# Patient Record
Sex: MALE | Race: WHITE | NOT HISPANIC OR LATINO | Employment: OTHER | URBAN - METROPOLITAN AREA
[De-identification: names, ages, dates, MRNs, and addresses within clinical notes are randomized per-mention and may not be internally consistent; named-entity substitution may affect disease eponyms.]

---

## 2017-03-01 DIAGNOSIS — I48.91 ATRIAL FIBRILLATION, UNSPECIFIED TYPE (HCC): ICD-10-CM

## 2017-03-01 DIAGNOSIS — I48.20 CHRONIC ATRIAL FIBRILLATION (HCC): ICD-10-CM

## 2017-03-01 RX ORDER — WARFARIN SODIUM 3 MG/1
TABLET ORAL
Qty: 135 TAB | Refills: 1 | Status: SHIPPED | OUTPATIENT
Start: 2017-03-01 | End: 2017-05-30 | Stop reason: SDUPTHER

## 2017-03-02 RX ORDER — WARFARIN SODIUM 3 MG/1
TABLET ORAL
Qty: 135 TAB | Refills: 1 | OUTPATIENT
Start: 2017-03-02

## 2017-03-06 ENCOUNTER — ANTICOAGULATION VISIT (OUTPATIENT)
Dept: VASCULAR LAB | Facility: MEDICAL CENTER | Age: 82
End: 2017-03-06
Attending: INTERNAL MEDICINE
Payer: MEDICARE

## 2017-03-06 DIAGNOSIS — I48.91 ATRIAL FIBRILLATION, UNSPECIFIED TYPE (HCC): ICD-10-CM

## 2017-03-06 LAB — INR PPP: 2.6 (ref 2–3.5)

## 2017-03-06 PROCEDURE — 99211 OFF/OP EST MAY X REQ PHY/QHP: CPT

## 2017-03-06 PROCEDURE — 85610 PROTHROMBIN TIME: CPT

## 2017-03-06 NOTE — PROGRESS NOTES
Anticoagulation Summary as of 3/6/2017     INR goal 2.0-3.0   Selected INR 2.6 (3/6/2017)   Maintenance plan 4.5 mg (3 mg x 1.5) on Mon, Wed, Fri; 3 mg (3 mg x 1) all other days   Weekly total 25.5 mg   No change documented Caroline Nolan   Plan last modified SANDIP Alejandro (8/8/2016)   Next INR check 5/29/2017   Target end date Indefinite    Indications   AF (atrial fibrillation) (CMS-[facility]) [I48.91]  Atrial fibrillation (CMS-[facility]) [I48.91]         Anticoagulation Episode Summary     [facility] location [facility]    Preferred lab     Send INR reminders to     Comments       Anticoagulation Care Providers     Provider Role Specialty Phone number    Micki Manuel D.O. Referring Internal Medicine [phone]    [facility] Responsible  [phone]        Anticoagulation Patient Findings   Negatives Missed Doses, Extra Doses, Medication Changes, Antibiotic Use, Diet Changes, Dental/Other Procedures, Hospitalization, Bleeding Gums, Nose Bleeds, Blood in Urine, Blood in Stool, Any Bruising, Other Complaints        Patient seen in clinic today, and INR was therapeutic at 2.6.  Confirmed the current warfarin dosing regimen and patient compliance. Patient denies any interval changes to diet and/or medications. Patient denies any signs/symptoms of bleeding or clotting. Patient will continue with the current warfarin dosing regimen, and will follow up again in 12 weeks.    Next appt Monday  May 29, 2017 9:30am    Franki WilkinsD

## 2017-03-06 NOTE — MR AVS SNAPSHOT
Seven Kowalski   3/6/2017 9:30 AM   Anticoagulation Visit   MRN: [id number]    Department:  Vascular Medicine   Dept Phone:  [phone]    Description:  Male : 1934   Provider:  [facility] EXAM 4           Allergies as of 3/6/2017     No Known Allergies      You were diagnosed with     Atrial fibrillation, unspecified type (CMS-HCC)   [0779469]         Vital Signs     Smoking Status                   Passive Smoke Exposure - Never Smoker           Basic Information     Date Of Birth Sex Race Ethnicity Preferred Language    1934 Male White Non- English      Your appointments     Mar 06, 2017  9:30 AM   Established Patient with [facility] EXAM 4   [facility]  (--)    [address]  [place] [address] [phone]            May 29, 2017  9:30 AM   Established Patient with [facility] EXAM 5   [facility]  (--)    [address]  [place] [address]   [phone]              Problem List              ICD-10-CM Priority Class Noted - Resolved    DM (diabetes mellitus) (CMS-HCC) E11.9   2010 - Present    Hypothyroid E03.9   2010 - Present    Hyperlipidemia E78.5   2010 - Present    Colon polyp K63.5   2010 - Present    Pneumonia J18.9   2015 - Present    Transaminitis R74.0   2015 - Present    AF (atrial fibrillation) (CMS-HCC) I48.91   2015 - Present    Macrocytic anemia D53.9   2015 - Present    Leukocytosis D72.829   2015 - Present    CKD (chronic kidney disease) N18.9   2015 - Present    Priapism N48.30   2015 - Present    Urine retention R33.9   2015 - Present    Acute worsening of stage 3 chronic kidney disease N18.3   2015 - Present    Hyperkalemia E87.5   2015 - Present    Hyponatremia E87.1   2015 - Present    Atrial fibrillation (CMS-HCC) I48.91   2015 - Present      Health Maintenance        Date Due Completion  Dates    DIABETES MONOFILAMENT / LE EXAM 1935 ---    RETINAL SCREENING 8/21/1952 ---    IMM DTaP/Tdap/Td Vaccine (1 - Tdap) 8/21/1953 ---    IMM ZOSTER VACCINE 8/21/1994 ---    IMM PNEUMOCOCCAL 65+ (ADULT) LOW/MEDIUM RISK SERIES (2 of 2 - PPSV23) 3/3/2015 3/3/2014    IMM INFLUENZA (1) 9/1/2016 10/10/2014    URINE ACR / MICROALBUMIN 3/8/2017 3/8/2016    A1C SCREENING 6/21/2017 12/21/2016, 10/13/2016, 6/29/2016, 3/8/2016, 3/8/2016, 11/3/2015, 11/3/2015, 7/27/2015, 2/17/2015, 11/8/2010    FASTING LIPID PROFILE 6/29/2017 6/29/2016, 3/8/2016, 3/8/2016, 11/3/2015, 2/17/2015, 2/17/2015    SERUM CREATININE 12/21/2017 12/21/2016, 8/25/2016, 6/29/2016, 3/8/2016, 3/8/2016, 11/3/2015, 8/31/2015, 8/2/2015, 8/1/2015, 8/1/2015, 7/31/2015, 7/30/2015, 7/29/2015, 7/28/2015, 7/27/2015, 5/14/2015, 2/17/2015    COLONOSCOPY 11/19/2020 11/19/2010 (Done)    Override on 11/19/2010: Done ([facility])            Results     POCT Protime      Component    INR    2.6                        Current Immunizations     13-VALENT PCV PREVNAR 3/3/2014    Influenza TIV (IM) 10/10/2014      Below and/or attached are the medications your provider expects you to take. Review all of your home medications and newly ordered medications with your provider and/or pharmacist. Follow medication instructions as directed by your provider and/or pharmacist. Please keep your medication list with you and share with your provider. Update the information when medications are discontinued, doses are changed, or new medications (including over-the-counter products) are added; and carry medication information at all times in the event of emergency situations     Allergies:  No Known Allergies          Medications  Valid as of: March 06, 2017 -  9:16 AM    Generic Name Brand Name Tablet Size Instructions for use    Allopurinol (Tab) ZYLOPRIM 100 MG Take 100 mg by mouth 2 times a day.        Aspirin (Tablet Delayed Response) ECOTRIN 81 MG Take 81 mg by mouth every day with  lunch.        Atorvastatin Calcium (Tab) LIPITOR 20 MG Take 20 mg by mouth every evening.        Carvedilol (Tab) COREG 3.125 MG Take 1 Tab by mouth 2 times a day, with meals.        Ergocalciferol (Cap) DRISDOL 29052 UNITS Take 50,000 Units by mouth every Wednesday.        Insulin Detemir (Solution) LEVEMIR 100 UNIT/ML Inject 10 Units as instructed every evening.        Insulin Lispro (Solution Pen-injector) HUMALOG 100 UNIT/ML Inject 2-8 Units as instructed 3 times a day before meals.        Iron Combinations (Tab) Iron  Take 1 Tab by mouth every day.        Levothyroxine Sodium (Tab) SYNTHROID 100 MCG Take 100 mcg by mouth Every morning on an empty stomach.        Multiple Vitamin (Tab) THERAGRAN  Take 1 Tab by mouth every day.        Pioglitazone HCl (Tab) ACTOS 45 MG Take 45 mg by mouth every day.        Warfarin Sodium (Tab) COUMADIN 3 MG TAKE 1- 1 1/2 TABLETS BY MOUTH EVERYDAY AS DIRECTED BY COUMADIN CLINIC        .                 Medicines prescribed today were sent to:     [facility][id number] [place] [address]    Phone: [phone] Fax: [phone]    Open 24 Hours?: No      Medication refill instructions:       If your prescription bottle indicates you have medication refills left, it is not necessary to call your provider’s office. Please contact your pharmacy and they will refill your medication.    If your prescription bottle indicates you do not have any refills left, you may request refills at any time through one of the following ways: The online [employer] system (except Urgent Care), by calling your provider’s office, or by asking your pharmacy to contact your provider’s office with a refill request. Medication refills are processed only during regular business hours and may not be available until the next business day. Your provider may request additional information or to have a follow-up visit with you prior to refilling your medication.   *Please  Note: Medication refills are assigned a new Rx number when refilled electronically. Your pharmacy may indicate that no refills were authorized even though a new prescription for the same medication is available at the pharmacy. Please request the medicine by name with the pharmacy before contacting your provider for a refill.        Other Notes About Your Plan     Colonoscopy/polyp 2005. Due for repeat 2010        Warfarin Dosing Calendar   March 2017 Details    Sun Mon Tue Wed Thu Fri Sat        1               2               3               4                 5               6   2.6   4.5 mg   See details      7      3 mg         8      4.5 mg         9      3 mg         10      4.5 mg         11      3 mg           12      3 mg         13      4.5 mg         14      3 mg         15      4.5 mg         16      3 mg         17      4.5 mg         18      3 mg           19      3 mg         20      4.5 mg         21      3 mg         22      4.5 mg         23      3 mg         24      4.5 mg         25      3 mg           26      3 mg         27      4.5 mg         28      3 mg         29      4.5 mg         30      3 mg         31      4.5 mg           Date Details   03/06 This INR check   INR: 2.6               How to take your warfarin dose     To take:  3 mg Take 1 of the 3 mg tablets.    To take:  4.5 mg Take 1.5 of the 3 mg tablets.           Warfarin Dosing Calendar   April 2017 Details    Sun Mon Tue Wed Thu Fri Sat           1      3 mg           2      3 mg         3      4.5 mg         4      3 mg         5      4.5 mg         6      3 mg         7      4.5 mg         8      3 mg           9      3 mg         10      4.5 mg         11      3 mg         12      4.5 mg         13      3 mg         14      4.5 mg         15      3 mg           16      3 mg         17      4.5 mg         18      3 mg         19      4.5 mg         20      3 mg         21      4.5 mg         22      3 mg           23      3 mg          24      4.5 mg         25      3 mg         26      4.5 mg         27      3 mg         28      4.5 mg         29      3 mg           30      3 mg                Date Details   No additional details            How to take your warfarin dose     To take:  3 mg Take 1 of the 3 mg tablets.    To take:  4.5 mg Take 1.5 of the 3 mg tablets.           Warfarin Dosing Calendar   May 2017 Details    Sun Mon Tue Wed Thu Fri Sat      1      4.5 mg         2      3 mg         3      4.5 mg         4      3 mg         5      4.5 mg         6      3 mg           7      3 mg         8      4.5 mg         9      3 mg         10      4.5 mg         11      3 mg         12      4.5 mg         13      3 mg           14      3 mg         15      4.5 mg         16      3 mg         17      4.5 mg         18      3 mg         19      4.5 mg         20      3 mg           21      3 mg         22      4.5 mg         23      3 mg         24      4.5 mg         25      3 mg         26      4.5 mg         27      3 mg           28      3 mg         29      4.5 mg         30               31                   Date Details   No additional details    Date of next INR:  5/29/2017         How to take your warfarin dose     To take:  3 mg Take 1 of the 3 mg tablets.    To take:  4.5 mg Take 1.5 of the 3 mg tablets.              MyChart Status: Patient Declined

## 2017-03-07 LAB — INR BLD: 2.6 (ref 0.9–1.2)

## 2017-04-11 ENCOUNTER — HOSPITAL ENCOUNTER (OUTPATIENT)
Dept: LAB | Facility: MEDICAL CENTER | Age: 82
End: 2017-04-11
Attending: FAMILY MEDICINE
Payer: MEDICARE

## 2017-04-11 LAB
ANION GAP SERPL CALC-SCNC: 6 MMOL/L (ref 0–11.9)
BUN SERPL-MCNC: 30 MG/DL (ref 8–22)
CALCIUM SERPL-MCNC: 9.6 MG/DL (ref 8.5–10.5)
CHLORIDE SERPL-SCNC: 106 MMOL/L (ref 96–112)
CO2 SERPL-SCNC: 27 MMOL/L (ref 20–33)
CREAT SERPL-MCNC: 1.68 MG/DL (ref 0.5–1.4)
EST. AVERAGE GLUCOSE BLD GHB EST-MCNC: 226 MG/DL
GFR SERPL CREATININE-BSD FRML MDRD: 39 ML/MIN/1.73 M 2
GLUCOSE SERPL-MCNC: 127 MG/DL (ref 65–99)
HBA1C MFR BLD: 9.5 % (ref 0–5.6)
POTASSIUM SERPL-SCNC: 4.3 MMOL/L (ref 3.6–5.5)
SODIUM SERPL-SCNC: 139 MMOL/L (ref 135–145)

## 2017-04-11 PROCEDURE — 36415 COLL VENOUS BLD VENIPUNCTURE: CPT

## 2017-04-11 PROCEDURE — 83036 HEMOGLOBIN GLYCOSYLATED A1C: CPT

## 2017-04-11 PROCEDURE — 80048 BASIC METABOLIC PNL TOTAL CA: CPT

## 2017-05-10 ENCOUNTER — HOSPITAL ENCOUNTER (OUTPATIENT)
Dept: LAB | Facility: MEDICAL CENTER | Age: 82
End: 2017-05-10
Attending: NURSE PRACTITIONER
Payer: MEDICARE

## 2017-05-10 LAB
25(OH)D3 SERPL-MCNC: 39 NG/ML (ref 30–100)
ALBUMIN SERPL BCP-MCNC: 4 G/DL (ref 3.2–4.9)
APPEARANCE UR: CLEAR
BACTERIA #/AREA URNS HPF: ABNORMAL /HPF
BASOPHILS # BLD AUTO: 1.2 % (ref 0–1.8)
BASOPHILS # BLD: 0.11 K/UL (ref 0–0.12)
BILIRUB UR QL STRIP.AUTO: NEGATIVE
BUN SERPL-MCNC: 40 MG/DL (ref 8–22)
CALCIUM SERPL-MCNC: 9.4 MG/DL (ref 8.5–10.5)
CHLORIDE SERPL-SCNC: 106 MMOL/L (ref 96–112)
CO2 SERPL-SCNC: 24 MMOL/L (ref 20–33)
COLOR UR: ABNORMAL
CREAT SERPL-MCNC: 1.64 MG/DL (ref 0.5–1.4)
CREAT UR-MCNC: 69.7 MG/DL
EOSINOPHIL # BLD AUTO: 0.21 K/UL (ref 0–0.51)
EOSINOPHIL NFR BLD: 2.2 % (ref 0–6.9)
EPI CELLS #/AREA URNS HPF: ABNORMAL /HPF
ERYTHROCYTE [DISTWIDTH] IN BLOOD BY AUTOMATED COUNT: 55.9 FL (ref 35.9–50)
EST. AVERAGE GLUCOSE BLD GHB EST-MCNC: 226 MG/DL
GFR SERPL CREATININE-BSD FRML MDRD: 40 ML/MIN/1.73 M 2
GLUCOSE SERPL-MCNC: 131 MG/DL (ref 65–99)
GLUCOSE UR STRIP.AUTO-MCNC: NEGATIVE MG/DL
HBA1C MFR BLD: 9.5 % (ref 0–5.6)
HCT VFR BLD AUTO: 43.7 % (ref 42–52)
HGB BLD-MCNC: 14.3 G/DL (ref 14–18)
IMM GRANULOCYTES # BLD AUTO: 0.07 K/UL (ref 0–0.11)
IMM GRANULOCYTES NFR BLD AUTO: 0.7 % (ref 0–0.9)
KETONES UR STRIP.AUTO-MCNC: NEGATIVE MG/DL
LEUKOCYTE ESTERASE UR QL STRIP.AUTO: NEGATIVE
LYMPHOCYTES # BLD AUTO: 1.89 K/UL (ref 1–4.8)
LYMPHOCYTES NFR BLD: 19.9 % (ref 22–41)
MAGNESIUM SERPL-MCNC: 1.5 MG/DL (ref 1.5–2.5)
MCH RBC QN AUTO: 34.1 PG (ref 27–33)
MCHC RBC AUTO-ENTMCNC: 32.7 G/DL (ref 33.7–35.3)
MCV RBC AUTO: 104.3 FL (ref 81.4–97.8)
MICRO URNS: ABNORMAL
MONOCYTES # BLD AUTO: 1.1 K/UL (ref 0–0.85)
MONOCYTES NFR BLD AUTO: 11.6 % (ref 0–13.4)
MUCOUS THREADS #/AREA URNS HPF: ABNORMAL /HPF
NEUTROPHILS # BLD AUTO: 6.12 K/UL (ref 1.82–7.42)
NEUTROPHILS NFR BLD: 64.4 % (ref 44–72)
NITRITE UR QL STRIP.AUTO: NEGATIVE
NRBC # BLD AUTO: 0 K/UL
NRBC BLD AUTO-RTO: 0 /100 WBC
PH UR STRIP.AUTO: 5.5 [PH]
PHOSPHATE SERPL-MCNC: 3.2 MG/DL (ref 2.5–4.5)
PLATELET # BLD AUTO: 215 K/UL (ref 164–446)
PMV BLD AUTO: 10.3 FL (ref 9–12.9)
POTASSIUM SERPL-SCNC: 4.5 MMOL/L (ref 3.6–5.5)
PROT UR QL STRIP: 50 MG/DL
PROT UR-MCNC: 44 MG/DL (ref 0–15)
PROT/CREAT UR: 631 MG/G (ref 15–68)
PTH-INTACT SERPL-MCNC: 143.9 PG/ML (ref 14–72)
RBC # BLD AUTO: 4.19 M/UL (ref 4.7–6.1)
RBC UR QL AUTO: NEGATIVE
SODIUM SERPL-SCNC: 137 MMOL/L (ref 135–145)
SP GR UR STRIP.AUTO: 1.01
URATE SERPL-MCNC: 5.1 MG/DL (ref 2.5–8.3)
WBC # BLD AUTO: 9.5 K/UL (ref 4.8–10.8)
WBC #/AREA URNS HPF: ABNORMAL /HPF

## 2017-05-10 PROCEDURE — 82306 VITAMIN D 25 HYDROXY: CPT

## 2017-05-10 PROCEDURE — 82570 ASSAY OF URINE CREATININE: CPT

## 2017-05-10 PROCEDURE — 83970 ASSAY OF PARATHORMONE: CPT

## 2017-05-10 PROCEDURE — 36415 COLL VENOUS BLD VENIPUNCTURE: CPT

## 2017-05-10 PROCEDURE — 83036 HEMOGLOBIN GLYCOSYLATED A1C: CPT

## 2017-05-10 PROCEDURE — 80069 RENAL FUNCTION PANEL: CPT

## 2017-05-10 PROCEDURE — 84156 ASSAY OF PROTEIN URINE: CPT

## 2017-05-10 PROCEDURE — 84550 ASSAY OF BLOOD/URIC ACID: CPT

## 2017-05-10 PROCEDURE — 85025 COMPLETE CBC W/AUTO DIFF WBC: CPT

## 2017-05-10 PROCEDURE — 81001 URINALYSIS AUTO W/SCOPE: CPT

## 2017-05-10 PROCEDURE — 83735 ASSAY OF MAGNESIUM: CPT

## 2017-05-30 ENCOUNTER — ANTICOAGULATION VISIT (OUTPATIENT)
Dept: VASCULAR LAB | Facility: MEDICAL CENTER | Age: 82
End: 2017-05-30
Attending: INTERNAL MEDICINE
Payer: MEDICARE

## 2017-05-30 DIAGNOSIS — I48.91 ATRIAL FIBRILLATION, UNSPECIFIED TYPE (HCC): ICD-10-CM

## 2017-05-30 LAB
INR BLD: 2.4 (ref 0.9–1.2)
INR PPP: 2.4 (ref 2–3.5)

## 2017-05-30 PROCEDURE — 85610 PROTHROMBIN TIME: CPT

## 2017-05-30 PROCEDURE — 99211 OFF/OP EST MAY X REQ PHY/QHP: CPT | Performed by: NURSE PRACTITIONER

## 2017-05-30 RX ORDER — WARFARIN SODIUM 3 MG/1
TABLET ORAL
Qty: 135 TAB | Refills: 6 | Status: SHIPPED | OUTPATIENT
Start: 2017-05-30

## 2017-05-30 NOTE — PROGRESS NOTES
Anticoagulation Summary as of 5/30/2017     INR goal 2.0-3.0   Selected INR 2.4 (5/30/2017)   Maintenance plan 4.5 mg (3 mg x 1.5) on Mon, Wed, Fri; 3 mg (3 mg x 1) all other days   Weekly total 25.5 mg   Plan last modified SANDIP Alejandro (8/8/2016)   Next INR check 8/8/2017   Target end date Indefinite    Indications   AF (atrial fibrillation) (CMS-[facility]) [I48.91]  Atrial fibrillation (CMS-[facility]) [I48.91]         Anticoagulation Episode Summary     [facility]    Preferred lab     Send INR reminders to     Comments       Anticoagulation Care Providers     Provider Role Specialty Phone number    Micki Manuel D.O. Referring Internal Medicine [phone]    [facility] Responsible  [phone]        Anticoagulation Patient Findings    Pt is therapeutic today. Denies any changes in medications or diet. Med rec completed and reviewed. Patient denies any s/sx of bleeding or clotting. Will continue dosing as outlined in calendar. Will follow-up with pt in 2 months.    Tori MADRID

## 2017-05-30 NOTE — MR AVS SNAPSHOT
Seven Kowalski   2017 9:30 AM   Anticoagulation Visit   MRN: [id number]    Department:  Vascular Medicine   Dept Phone:  [phone]    Description:  Male : 1934   Provider:  [facility] EXAM 5           Allergies as of 2017     No Known Allergies      You were diagnosed with     Atrial fibrillation, unspecified type (CMS-HCC)   [3261115]         Vital Signs     Smoking Status                   Passive Smoke Exposure - Never Smoker           Basic Information     Date Of Birth Sex Race Ethnicity Preferred Language    1934 Male White Non- English      Your appointments     Aug 08, 2017  9:30 AM   Established Patient with [facility] EXAM 5   [facility]  (--)    [address] [address] [phone]              Problem List              ICD-10-CM Priority Class Noted - Resolved    DM (diabetes mellitus) (CMS-HCC) E11.9   2010 - Present    Hypothyroid E03.9   2010 - Present    Hyperlipidemia E78.5   2010 - Present    Colon polyp K63.5   2010 - Present    Pneumonia J18.9   2015 - Present    Transaminitis R74.0   2015 - Present    AF (atrial fibrillation) (CMS-HCC) I48.91   2015 - Present    Macrocytic anemia D53.9   2015 - Present    Leukocytosis D72.829   2015 - Present    CKD (chronic kidney disease) N18.9   2015 - Present    Priapism N48.30   2015 - Present    Urine retention R33.9   2015 - Present    Acute worsening of stage 3 chronic kidney disease N18.3   2015 - Present    Hyperkalemia E87.5   2015 - Present    Hyponatremia E87.1   2015 - Present    Atrial fibrillation (CMS-HCC) I48.91   2015 - Present      Health Maintenance        Date Due Completion Dates    DIABETES MONOFILAMENT / LE EXAM 1935 ---    RETINAL SCREENING 1952 ---    IMM DTaP/Tdap/Td Vaccine (1 - Tdap) 1953 ---    IMM ZOSTER VACCINE 1994 ---    IMM PNEUMOCOCCAL 65+  (ADULT) LOW/MEDIUM RISK SERIES (2 of 2 - PPSV23) 3/3/2015 3/3/2014    URINE ACR / MICROALBUMIN 3/8/2017 3/8/2016    FASTING LIPID PROFILE 6/29/2017 6/29/2016, 3/8/2016, 3/8/2016, 11/3/2015, 2/17/2015, 2/17/2015    A1C SCREENING 11/10/2017 5/10/2017, 4/11/2017, 12/21/2016, 10/13/2016, 6/29/2016, 3/8/2016, 3/8/2016, 11/3/2015, 11/3/2015, 7/27/2015, 2/17/2015, 11/8/2010    SERUM CREATININE 5/10/2018 5/10/2017, 4/11/2017, 12/21/2016, 8/25/2016, 6/29/2016, 3/8/2016, 3/8/2016, 11/3/2015, 8/31/2015, 8/2/2015, 8/1/2015, 8/1/2015, 7/31/2015, 7/30/2015, 7/29/2015, 7/28/2015, 7/27/2015, 5/14/2015, 2/17/2015    COLONOSCOPY 11/19/2020 11/19/2010 (Done)    Override on 11/19/2010: Done ([facility])            Results     POCT Protime      Component    INR    2.4                        Current Immunizations     13-VALENT PCV PREVNAR 3/3/2014    Influenza TIV (IM) 10/10/2014      Below and/or attached are the medications your provider expects you to take. Review all of your home medications and newly ordered medications with your provider and/or pharmacist. Follow medication instructions as directed by your provider and/or pharmacist. Please keep your medication list with you and share with your provider. Update the information when medications are discontinued, doses are changed, or new medications (including over-the-counter products) are added; and carry medication information at all times in the event of emergency situations     Allergies:  No Known Allergies          Medications  Valid as of: May 30, 2017 -  9:39 AM    Generic Name Brand Name Tablet Size Instructions for use    Allopurinol (Tab) ZYLOPRIM 100 MG Take 100 mg by mouth 2 times a day.        Aspirin (Tablet Delayed Response) ECOTRIN 81 MG Take 81 mg by mouth every day with lunch.        Atorvastatin Calcium (Tab) LIPITOR 20 MG Take 20 mg by mouth every evening.        Carvedilol (Tab) COREG 3.125 MG Take 1 Tab by mouth 2 times a day, with meals.        Ergocalciferol (Cap)  DRISDOL 81573 UNITS Take 50,000 Units by mouth every Wednesday.        Insulin Detemir (Solution) LEVEMIR 100 UNIT/ML Inject 10 Units as instructed every evening.        Insulin Lispro (Solution Pen-injector) HUMALOG 100 UNIT/ML Inject 2-8 Units as instructed 3 times a day before meals.        Iron Combinations (Tab) Iron  Take 1 Tab by mouth every day.        Levothyroxine Sodium (Tab) SYNTHROID 100 MCG Take 100 mcg by mouth Every morning on an empty stomach.        Multiple Vitamin (Tab) THERAGRAN  Take 1 Tab by mouth every day.        Pioglitazone HCl (Tab) ACTOS 45 MG Take 45 mg by mouth every day.        Warfarin Sodium (Tab) COUMADIN 3 MG TAKE 1- 1 1/2 TABLETS BY MOUTH EVERYDAY AS DIRECTED BY COUMADIN CLINIC        .                 Medicines prescribed today were sent to:     [facility] #[id number] [place] [address]    Phone: [phone] Fax: [phone]    Open 24 Hours?: No      Medication refill instructions:       If your prescription bottle indicates you have medication refills left, it is not necessary to call your provider’s office. Please contact your pharmacy and they will refill your medication.    If your prescription bottle indicates you do not have any refills left, you may request refills at any time through one of the following ways: The online [employer] system (except Urgent Care), by calling your provider’s office, or by asking your pharmacy to contact your provider’s office with a refill request. Medication refills are processed only during regular business hours and may not be available until the next business day. Your provider may request additional information or to have a follow-up visit with you prior to refilling your medication.   *Please Note: Medication refills are assigned a new Rx number when refilled electronically. Your pharmacy may indicate that no refills were authorized even though a new prescription for the same medication is  available at the pharmacy. Please request the medicine by name with the pharmacy before contacting your provider for a refill.        Other Notes About Your Plan     Colonoscopy/polyp 2005. Due for repeat 2010        Warfarin Dosing Calendar   May 2017 Details    Sun Mon Tue Wed Thu Fri Sat      1               2               3               4               5               6                 7               8               9               10               11               12               13                 14               15               16               17               18               19               20                 21               22               23               24               25               26               27                 28               29               30   2.4   3 mg   See details      31      4.5 mg             Date Details   05/30 This INR check   INR: 2.4               How to take your warfarin dose     To take:  3 mg Take 1 of the 3 mg tablets.    To take:  4.5 mg Take 1.5 of the 3 mg tablets.           Warfarin Dosing Calendar   June 2017 Details    Sun Mon Tue Wed Thu Fri Sat         1      3 mg         2      4.5 mg         3      3 mg           4      3 mg         5      4.5 mg         6      3 mg         7      4.5 mg         8      3 mg         9      4.5 mg         10      3 mg           11      3 mg         12      4.5 mg         13      3 mg         14      4.5 mg         15      3 mg         16      4.5 mg         17      3 mg           18      3 mg         19      4.5 mg         20      3 mg         21      4.5 mg         22      3 mg         23      4.5 mg         24      3 mg           25      3 mg         26      4.5 mg         27      3 mg         28      4.5 mg         29      3 mg         30      4.5 mg           Date Details   No additional details            How to take your warfarin dose     To take:  3 mg Take 1 of the 3 mg tablets.    To take:  4.5 mg Take 1.5 of  the 3 mg tablets.           Warfarin Dosing Calendar   July 2017 Details    Sun Mon Tue Wed Thu Fri Sat           1      3 mg           2      3 mg         3      4.5 mg         4      3 mg         5      4.5 mg         6      3 mg         7      4.5 mg         8      3 mg           9      3 mg         10      4.5 mg         11      3 mg         12      4.5 mg         13      3 mg         14      4.5 mg         15      3 mg           16      3 mg         17      4.5 mg         18      3 mg         19      4.5 mg         20      3 mg         21      4.5 mg         22      3 mg           23      3 mg         24      4.5 mg         25      3 mg         26      4.5 mg         27      3 mg         28      4.5 mg         29      3 mg           30      3 mg         31      4.5 mg               Date Details   No additional details            How to take your warfarin dose     To take:  3 mg Take 1 of the 3 mg tablets.    To take:  4.5 mg Take 1.5 of the 3 mg tablets.           Warfarin Dosing Calendar   August 2017 Details    Sun Mon Tue Wed Thu Fri Sat       1      3 mg         2      4.5 mg         3      3 mg         4      4.5 mg         5      3 mg           6      3 mg         7      4.5 mg         8      3 mg         9               10               11               12                 13               14               15               16               17               18               19                 20               21               22               23               24               25               26                 27               28               29               30               31                  Date Details   No additional details    Date of next INR:  8/8/2017         How to take your warfarin dose     To take:  3 mg Take 1 of the 3 mg tablets.    To take:  4.5 mg Take 1.5 of the 3 mg tablets.              MyChart Status: Patient Declined

## 2017-07-20 ENCOUNTER — HOSPITAL ENCOUNTER (OUTPATIENT)
Dept: LAB | Facility: MEDICAL CENTER | Age: 82
End: 2017-07-20
Attending: INTERNAL MEDICINE
Payer: MEDICARE

## 2017-07-20 LAB — GFR SERPL CREATININE-BSD FRML MDRD: 27 ML/MIN/1.73 M 2

## 2017-07-20 PROCEDURE — 82306 VITAMIN D 25 HYDROXY: CPT

## 2017-07-20 PROCEDURE — 83970 ASSAY OF PARATHORMONE: CPT

## 2017-07-20 PROCEDURE — 36415 COLL VENOUS BLD VENIPUNCTURE: CPT

## 2017-07-20 PROCEDURE — 82570 ASSAY OF URINE CREATININE: CPT

## 2017-07-20 PROCEDURE — 84550 ASSAY OF BLOOD/URIC ACID: CPT

## 2017-07-20 PROCEDURE — 83735 ASSAY OF MAGNESIUM: CPT

## 2017-07-20 PROCEDURE — 83036 HEMOGLOBIN GLYCOSYLATED A1C: CPT

## 2017-07-20 PROCEDURE — 80053 COMPREHEN METABOLIC PANEL: CPT

## 2017-07-20 PROCEDURE — 84156 ASSAY OF PROTEIN URINE: CPT

## 2017-07-20 PROCEDURE — 81001 URINALYSIS AUTO W/SCOPE: CPT

## 2017-07-20 PROCEDURE — 84100 ASSAY OF PHOSPHORUS: CPT

## 2017-07-20 PROCEDURE — 85025 COMPLETE CBC W/AUTO DIFF WBC: CPT

## 2017-07-21 LAB
25(OH)D3 SERPL-MCNC: 42 NG/ML (ref 30–100)
ALBUMIN SERPL BCP-MCNC: 3.9 G/DL (ref 3.2–4.9)
ALBUMIN/GLOB SERPL: 1.3 G/DL
ALP SERPL-CCNC: 135 U/L (ref 30–99)
ALT SERPL-CCNC: 40 U/L (ref 2–50)
ANION GAP SERPL CALC-SCNC: 3 MMOL/L (ref 0–11.9)
APPEARANCE UR: CLEAR
AST SERPL-CCNC: 26 U/L (ref 12–45)
BASOPHILS # BLD AUTO: 1.4 % (ref 0–1.8)
BASOPHILS # BLD: 0.11 K/UL (ref 0–0.12)
BILIRUB SERPL-MCNC: 0.6 MG/DL (ref 0.1–1.5)
BILIRUB UR QL STRIP.AUTO: NEGATIVE
BUN SERPL-MCNC: 61 MG/DL (ref 8–22)
CALCIUM SERPL-MCNC: 9.4 MG/DL (ref 8.5–10.5)
CAOX CRY #/AREA URNS HPF: ABNORMAL /HPF
CHLORIDE SERPL-SCNC: 113 MMOL/L (ref 96–112)
CO2 SERPL-SCNC: 18 MMOL/L (ref 20–33)
COLOR UR: YELLOW
CREAT SERPL-MCNC: 2.31 MG/DL (ref 0.5–1.4)
CREAT UR-MCNC: 165.9 MG/DL
EOSINOPHIL # BLD AUTO: 0.18 K/UL (ref 0–0.51)
EOSINOPHIL NFR BLD: 2.3 % (ref 0–6.9)
ERYTHROCYTE [DISTWIDTH] IN BLOOD BY AUTOMATED COUNT: 61.7 FL (ref 35.9–50)
EST. AVERAGE GLUCOSE BLD GHB EST-MCNC: 186 MG/DL
GLOBULIN SER CALC-MCNC: 3 G/DL (ref 1.9–3.5)
GLUCOSE SERPL-MCNC: 107 MG/DL (ref 65–99)
GLUCOSE UR STRIP.AUTO-MCNC: NEGATIVE MG/DL
HBA1C MFR BLD: 8.1 % (ref 0–5.6)
HCT VFR BLD AUTO: 39.5 % (ref 42–52)
HGB BLD-MCNC: 13 G/DL (ref 14–18)
HYALINE CASTS #/AREA URNS LPF: >10 /LPF
IMM GRANULOCYTES # BLD AUTO: 0.06 K/UL (ref 0–0.11)
IMM GRANULOCYTES NFR BLD AUTO: 0.8 % (ref 0–0.9)
KETONES UR STRIP.AUTO-MCNC: NEGATIVE MG/DL
LEUKOCYTE ESTERASE UR QL STRIP.AUTO: NEGATIVE
LYMPHOCYTES # BLD AUTO: 2 K/UL (ref 1–4.8)
LYMPHOCYTES NFR BLD: 25.3 % (ref 22–41)
MAGNESIUM SERPL-MCNC: 1.8 MG/DL (ref 1.5–2.5)
MCH RBC QN AUTO: 34.7 PG (ref 27–33)
MCHC RBC AUTO-ENTMCNC: 32.9 G/DL (ref 33.7–35.3)
MCV RBC AUTO: 105.3 FL (ref 81.4–97.8)
MICRO URNS: ABNORMAL
MONOCYTES # BLD AUTO: 0.89 K/UL (ref 0–0.85)
MONOCYTES NFR BLD AUTO: 11.3 % (ref 0–13.4)
NEUTROPHILS # BLD AUTO: 4.66 K/UL (ref 1.82–7.42)
NEUTROPHILS NFR BLD: 58.9 % (ref 44–72)
NITRITE UR QL STRIP.AUTO: NEGATIVE
NRBC # BLD AUTO: 0 K/UL
NRBC BLD AUTO-RTO: 0 /100 WBC
PH UR STRIP.AUTO: 5 [PH]
PHOSPHATE SERPL-MCNC: 2.7 MG/DL (ref 2.5–4.5)
PLATELET # BLD AUTO: 239 K/UL (ref 164–446)
PMV BLD AUTO: 10.3 FL (ref 9–12.9)
POTASSIUM SERPL-SCNC: 5.5 MMOL/L (ref 3.6–5.5)
PROT SERPL-MCNC: 6.9 G/DL (ref 6–8.2)
PROT UR QL STRIP: 30 MG/DL
PROT UR-MCNC: 53.8 MG/DL (ref 0–15)
PROT/CREAT UR: 324 MG/G (ref 15–68)
PTH-INTACT SERPL-MCNC: 118.1 PG/ML (ref 14–72)
RBC # BLD AUTO: 3.75 M/UL (ref 4.7–6.1)
RBC # URNS HPF: ABNORMAL /HPF
RBC UR QL AUTO: NEGATIVE
SODIUM SERPL-SCNC: 134 MMOL/L (ref 135–145)
SP GR UR STRIP.AUTO: 1.01
URATE SERPL-MCNC: 5.3 MG/DL (ref 2.5–8.3)
WBC # BLD AUTO: 7.9 K/UL (ref 4.8–10.8)
WBC #/AREA URNS HPF: ABNORMAL /HPF

## 2017-08-08 ENCOUNTER — ANTICOAGULATION VISIT (OUTPATIENT)
Dept: VASCULAR LAB | Facility: MEDICAL CENTER | Age: 82
End: 2017-08-08
Attending: INTERNAL MEDICINE
Payer: MEDICARE

## 2017-08-08 VITALS — HEART RATE: 60 BPM | DIASTOLIC BLOOD PRESSURE: 71 MMHG | SYSTOLIC BLOOD PRESSURE: 113 MMHG

## 2017-08-08 DIAGNOSIS — I48.91 ATRIAL FIBRILLATION, UNSPECIFIED TYPE (HCC): ICD-10-CM

## 2017-08-08 LAB — INR PPP: 2.8 (ref 2–3.5)

## 2017-08-08 PROCEDURE — 99211 OFF/OP EST MAY X REQ PHY/QHP: CPT

## 2017-08-08 PROCEDURE — 85610 PROTHROMBIN TIME: CPT

## 2017-08-08 NOTE — PROGRESS NOTES
Anticoagulation Summary as of 8/8/2017     INR goal 2.0-3.0   Selected INR 2.8 (8/8/2017)   Maintenance plan 4.5 mg (3 mg x 1.5) on Mon, Wed, Fri; 3 mg (3 mg x 1) all other days   Weekly total 25.5 mg   No change documented Caroline Nolan   Plan last modified SANDIP Alejandro (8/8/2016)   Next INR check    Target end date Indefinite    Indications   AF (atrial fibrillation) (CMS-[facility]) [I48.91]  Atrial fibrillation (CMS-[facility]) [I48.91]         Anticoagulation Episode Summary     [facility]    Preferred lab     Send INR reminders to     Comments       Anticoagulation Care Providers     Provider Role Specialty Phone number    Micki Manuel D.O. Referring Internal Medicine [phone]    [facility] Responsible  [phone]        Anticoagulation Patient Findings   Negatives Missed Doses, Extra Doses, Medication Changes, Antibiotic Use, Diet Changes, Dental/Other Procedures, Hospitalization, Bleeding Gums, Nose Bleeds, Blood in Urine, Blood in Stool, Any Bruising, Other Complaints        Patient seen in clinic today, and INR was therapeutic at 2.8.  Confirmed the current warfarin dosing regimen and patient compliance. Patient denies any interval changes to diet and/or medications. Patient denies any signs/symptoms of bleeding or clotting.   Patient has gone through a lot, his wife passed in May, and [place].   Patient will continue with current regimen and will follow up & transfer his records after he gets established with a physician where he is moving. We wish him the best.    Franki WilkinsD

## 2017-08-08 NOTE — MR AVS SNAPSHOT
Seven Kowalski   2017 9:00 AM   Anticoagulation Visit   MRN: [id number]    Department:  Vascular Medicine   Dept Phone:  [phone]    Description:  Male : 1934   Provider:  [facility] EXAM 5           Allergies as of 2017     No Known Allergies      You were diagnosed with     Atrial fibrillation, unspecified type (CMS-HCC)   [[id number]]         Vital Signs     Blood Pressure Pulse Smoking Status             113/71 mmHg 60 Passive Smoke Exposure - Never Smoker         Basic Information     Date Of Birth Sex Race Ethnicity Preferred Language    1934 Male White Non- English      Your appointments     Aug 08, 2017  9:00 AM   Established Patient with [facility] EXAM 5   [facility]  (--)    [address] [facility] [facility] [address]   [phone]              Problem List              ICD-10-CM Priority Class Noted - Resolved    DM (diabetes mellitus) (CMS-HCC) E11.9   2010 - Present    Hypothyroid E03.9   2010 - Present    Hyperlipidemia E78.5   2010 - Present    Colon polyp K63.5   2010 - Present    Pneumonia J18.9   2015 - Present    Transaminitis R74.0   2015 - Present    AF (atrial fibrillation) (CMS-HCC) I48.91   2015 - Present    Macrocytic anemia D53.9   2015 - Present    Leukocytosis D72.829   2015 - Present    CKD (chronic kidney disease) N18.9   2015 - Present    Priapism N48.30   2015 - Present    Urine retention R33.9   2015 - Present    Acute worsening of stage 3 chronic kidney disease N18.3   2015 - Present    Hyperkalemia E87.5   2015 - Present    Hyponatremia E87.1   2015 - Present    Atrial fibrillation (CMS-HCC) I48.91   2015 - Present      Health Maintenance        Date Due Completion Dates    DIABETES MONOFILAMENT / LE EXAM 1935 ---    RETINAL SCREENING 1952 ---    IMM DTaP/Tdap/Td Vaccine (1 - Tdap) 1953 ---    IMM ZOSTER VACCINE 1994  ---    IMM PNEUMOCOCCAL 65+ (ADULT) LOW/MEDIUM RISK SERIES (2 of 2 - PPSV23) 3/3/2015 3/3/2014    FASTING LIPID PROFILE 6/29/2017 6/29/2016, 3/8/2016, 3/8/2016, 11/3/2015, 2/17/2015, 2/17/2015    IMM INFLUENZA (1) 9/1/2017 10/10/2014    A1C SCREENING 1/20/2018 7/20/2017, 5/10/2017, 4/11/2017, 12/21/2016, 10/13/2016, 6/29/2016, 3/8/2016, 3/8/2016, 11/3/2015, 11/3/2015, 7/27/2015, 2/17/2015, 11/8/2010    URINE ACR / MICROALBUMIN 7/20/2018 7/20/2017, 5/10/2017, 12/21/2016, 8/25/2016, 6/29/2016, 3/8/2016, 3/8/2016, 11/3/2015, 8/31/2015, 5/14/2015, 2/17/2015    SERUM CREATININE 7/20/2018 7/20/2017, 5/10/2017, 4/11/2017, 12/21/2016, 8/25/2016, 6/29/2016, 3/8/2016, 3/8/2016, 11/3/2015, 8/31/2015, 8/2/2015, 8/1/2015, 8/1/2015, 7/31/2015, 7/30/2015, 7/29/2015, 7/28/2015, 7/27/2015, 5/14/2015, 2/17/2015    COLONOSCOPY 11/19/2020 11/19/2010 (Done)    Override on 11/19/2010: Done ([facility])            Results     POCT Protime      Component    INR    2.8                        Current Immunizations     13-VALENT PCV PREVNAR 3/3/2014    Influenza TIV (IM) 10/10/2014      Below and/or attached are the medications your provider expects you to take. Review all of your home medications and newly ordered medications with your provider and/or pharmacist. Follow medication instructions as directed by your provider and/or pharmacist. Please keep your medication list with you and share with your provider. Update the information when medications are discontinued, doses are changed, or new medications (including over-the-counter products) are added; and carry medication information at all times in the event of emergency situations     Allergies:  No Known Allergies          Medications  Valid as of: August 08, 2017 -  8:47 AM    Generic Name Brand Name Tablet Size Instructions for use    Allopurinol (Tab) ZYLOPRIM 100 MG Take 100 mg by mouth 2 times a day.        Aspirin (Tablet Delayed Response) ECOTRIN 81 MG Take 81 mg by mouth every day with  lunch.        Atorvastatin Calcium (Tab) LIPITOR 20 MG Take 20 mg by mouth every evening.        Carvedilol (Tab) COREG 3.125 MG Take 1 Tab by mouth 2 times a day, with meals.        Ergocalciferol (Cap) DRISDOL 80276 UNITS Take 50,000 Units by mouth every Wednesday.        Insulin Detemir (Solution) LEVEMIR 100 UNIT/ML Inject 10 Units as instructed every evening.        Insulin Lispro (Solution Pen-injector) HUMALOG 100 UNIT/ML Inject 2-8 Units as instructed 3 times a day before meals.        Iron Combinations (Tab) Iron  Take 1 Tab by mouth every day.        Levothyroxine Sodium (Tab) SYNTHROID 100 MCG Take 100 mcg by mouth Every morning on an empty stomach.        Multiple Vitamin (Tab) THERAGRAN  Take 1 Tab by mouth every day.        Pioglitazone HCl (Tab) ACTOS 45 MG Take 45 mg by mouth every day.        Warfarin Sodium (Tab) COUMADIN 3 MG TAKE 1- 1 1/2 TABLETS BY MOUTH EVERYDAY AS DIRECTED BY COUMADIN CLINIC        .                 Medicines prescribed today were sent to:     [facility][id number] - [place] [address]    Phone: [phone] Fax: [phone]    Open 24 Hours?: No      Medication refill instructions:       If your prescription bottle indicates you have medication refills left, it is not necessary to call your provider’s office. Please contact your pharmacy and they will refill your medication.    If your prescription bottle indicates you do not have any refills left, you may request refills at any time through one of the following ways: The online [employer] system (except Urgent Care), by calling your provider’s office, or by asking your pharmacy to contact your provider’s office with a refill request. Medication refills are processed only during regular business hours and may not be available until the next business day. Your provider may request additional information or to have a follow-up visit with you prior to refilling your medication.   *Please  Note: Medication refills are assigned a new Rx number when refilled electronically. Your pharmacy may indicate that no refills were authorized even though a new prescription for the same medication is available at the pharmacy. Please request the medicine by name with the pharmacy before contacting your provider for a refill.        Other Notes About Your Plan     Colonoscopy/polyp 2005. Due for repeat 2010        Warfarin Dosing Calendar   August 2017 Details    Sun Mon Tue Wed Thu Fri Sat       1               2               3               4               5                 6               7               8   2.8   3 mg   See details      9      4.5 mg         10      3 mg         11      4.5 mg         12      3 mg           13      3 mg         14      4.5 mg         15      3 mg         16      4.5 mg         17      3 mg         18      4.5 mg         19      3 mg           20      3 mg         21      4.5 mg         22      3 mg         23      4.5 mg         24      3 mg         25      4.5 mg         26      3 mg           27      3 mg         28      4.5 mg         29      3 mg         30      4.5 mg         31      3 mg            Date Details   08/08 This INR check   INR: 2.8      Date of next INR: No date specified         How to take your warfarin dose     To take:  3 mg Take 1 of the 3 mg tablets.    To take:  4.5 mg Take 1.5 of the 3 mg tablets.              MyChart Status: Patient Declined

## 2017-08-18 LAB — INR BLD: 2.8 (ref 0.9–1.2)

## 2017-11-07 ENCOUNTER — ANTICOAGULATION MONITORING (OUTPATIENT)
Dept: VASCULAR LAB | Facility: MEDICAL CENTER | Age: 82
End: 2017-11-07

## 2017-11-07 DIAGNOSIS — I48.0 PAROXYSMAL ATRIAL FIBRILLATION (HCC): ICD-10-CM

## 2017-11-07 NOTE — PROGRESS NOTES
Discharged from [facility].  Anticoagulation managed by pcp in [place].  Gardenia Orozco, PharmD

## 2020-03-03 ENCOUNTER — LAB REQUISITION (OUTPATIENT)
Dept: LAB | Age: 85
End: 2020-03-03

## 2020-03-03 DIAGNOSIS — I10 ESSENTIAL (PRIMARY) HYPERTENSION: ICD-10-CM

## 2020-03-03 DIAGNOSIS — R60.0 LOCALIZED EDEMA: ICD-10-CM

## 2020-03-03 DIAGNOSIS — N18.30 CHRONIC KIDNEY DISEASE, STAGE 3 (MODERATE): ICD-10-CM

## 2020-03-03 DIAGNOSIS — D63.8 ANEMIA IN OTHER CHRONIC DISEASES CLASSIFIED ELSEWHERE: ICD-10-CM

## 2020-03-03 LAB
CREAT UR-MCNC: 91.9 MG/DL
MICROALBUMIN UR-MCNC: 106 MCG/MIN
MICROALBUMIN/CREAT UR: 1153.4 MG/G

## 2020-03-03 PROCEDURE — 82570 ASSAY OF URINE CREATININE: CPT | Performed by: CLINICAL MEDICAL LABORATORY

## 2020-03-03 PROCEDURE — PSEU8567 MICROALBUMIN URINE RANDOM: Performed by: CLINICAL MEDICAL LABORATORY

## 2020-03-03 PROCEDURE — 82043 UR ALBUMIN QUANTITATIVE: CPT | Performed by: CLINICAL MEDICAL LABORATORY

## 2020-07-07 ENCOUNTER — LAB REQUISITION (OUTPATIENT)
Dept: LAB | Age: 85
End: 2020-07-07

## 2020-07-07 DIAGNOSIS — D63.8 ANEMIA IN OTHER CHRONIC DISEASES CLASSIFIED ELSEWHERE: ICD-10-CM

## 2020-07-07 DIAGNOSIS — E11.22 TYPE 2 DIABETES MELLITUS WITH DIABETIC CHRONIC KIDNEY DISEASE (CMD): ICD-10-CM

## 2020-07-07 DIAGNOSIS — Z79.4 LONG TERM (CURRENT) USE OF INSULIN (CMD): ICD-10-CM

## 2020-07-07 DIAGNOSIS — N18.30 CHRONIC KIDNEY DISEASE, STAGE 3 (MODERATE): ICD-10-CM

## 2020-07-07 DIAGNOSIS — E87.5 HYPERKALEMIA: ICD-10-CM

## 2020-07-07 DIAGNOSIS — I10 ESSENTIAL (PRIMARY) HYPERTENSION: ICD-10-CM

## 2020-07-07 DIAGNOSIS — R60.0 LOCALIZED EDEMA: ICD-10-CM

## 2020-07-07 LAB
CREAT UR-MCNC: 79.6 MG/DL
MICROALBUMIN UR-MCNC: 19.6 MG/DL
MICROALBUMIN/CREAT UR: 246.2 MG/G

## 2020-07-07 PROCEDURE — 82043 UR ALBUMIN QUANTITATIVE: CPT | Performed by: CLINICAL MEDICAL LABORATORY

## 2020-07-07 PROCEDURE — 82570 ASSAY OF URINE CREATININE: CPT | Performed by: CLINICAL MEDICAL LABORATORY

## 2020-07-07 PROCEDURE — PSEU8567 MICROALBUMIN URINE RANDOM: Performed by: CLINICAL MEDICAL LABORATORY

## 2020-10-05 ENCOUNTER — LAB REQUISITION (OUTPATIENT)
Dept: LAB | Age: 85
End: 2020-10-05

## 2020-10-05 DIAGNOSIS — N18.4 CHRONIC KIDNEY DISEASE, STAGE 4 (SEVERE) (CMD): ICD-10-CM

## 2020-10-05 DIAGNOSIS — E11.22 TYPE 2 DIABETES MELLITUS WITH DIABETIC CHRONIC KIDNEY DISEASE (CMD): ICD-10-CM

## 2020-10-05 PROCEDURE — 82570 ASSAY OF URINE CREATININE: CPT | Performed by: CLINICAL MEDICAL LABORATORY

## 2020-10-05 PROCEDURE — PSEU8229 VITAMIN D -25 HYDROXY: Performed by: CLINICAL MEDICAL LABORATORY

## 2020-10-05 PROCEDURE — 82306 VITAMIN D 25 HYDROXY: CPT | Performed by: CLINICAL MEDICAL LABORATORY

## 2020-10-05 PROCEDURE — 82043 UR ALBUMIN QUANTITATIVE: CPT | Performed by: CLINICAL MEDICAL LABORATORY

## 2020-10-05 PROCEDURE — PSEU8567 MICROALBUMIN URINE RANDOM: Performed by: CLINICAL MEDICAL LABORATORY

## 2020-10-06 LAB
25(OH)D3+25(OH)D2 SERPL-MCNC: 45.9 NG/ML (ref 30–100)
CREAT UR-MCNC: 149 MG/DL
MICROALBUMIN UR-MCNC: 48.5 MG/DL
MICROALBUMIN/CREAT UR: 325.5 MG/G

## 2021-01-07 ENCOUNTER — LAB REQUISITION (OUTPATIENT)
Dept: LAB | Age: 86
End: 2021-01-07

## 2021-01-07 DIAGNOSIS — E11.22 TYPE 2 DIABETES MELLITUS WITH DIABETIC CHRONIC KIDNEY DISEASE (CMD): ICD-10-CM

## 2021-01-07 DIAGNOSIS — N18.4 CHRONIC KIDNEY DISEASE, STAGE 4 (SEVERE) (CMD): ICD-10-CM

## 2021-01-07 DIAGNOSIS — D63.8 ANEMIA IN OTHER CHRONIC DISEASES CLASSIFIED ELSEWHERE: ICD-10-CM

## 2021-01-07 DIAGNOSIS — R60.0 LOCALIZED EDEMA: ICD-10-CM

## 2021-01-07 DIAGNOSIS — I10 ESSENTIAL (PRIMARY) HYPERTENSION: ICD-10-CM

## 2021-01-07 DIAGNOSIS — R80.8 OTHER PROTEINURIA: ICD-10-CM

## 2021-01-07 LAB
CREAT UR-MCNC: 165 MG/DL
MICROALBUMIN UR-MCNC: 1750 MG/DL
MICROALBUMIN/CREAT UR: ABNORMAL MG/G

## 2021-01-07 PROCEDURE — 82570 ASSAY OF URINE CREATININE: CPT | Performed by: CLINICAL MEDICAL LABORATORY

## 2021-01-07 PROCEDURE — 82043 UR ALBUMIN QUANTITATIVE: CPT | Performed by: CLINICAL MEDICAL LABORATORY

## 2021-01-07 PROCEDURE — PSEU8567 MICROALBUMIN URINE RANDOM: Performed by: CLINICAL MEDICAL LABORATORY

## 2021-02-04 ENCOUNTER — LAB REQUISITION (OUTPATIENT)
Dept: LAB | Age: 86
End: 2021-02-04

## 2021-02-04 DIAGNOSIS — E11.22 TYPE 2 DIABETES MELLITUS WITH DIABETIC CHRONIC KIDNEY DISEASE (CMD): ICD-10-CM

## 2021-02-04 DIAGNOSIS — R80.8 OTHER PROTEINURIA: ICD-10-CM

## 2021-02-04 DIAGNOSIS — N18.30 CHRONIC KIDNEY DISEASE, STAGE 3 UNSPECIFIED (CMD): ICD-10-CM

## 2021-02-04 LAB
CREAT UR-MCNC: 107 MG/DL
MICROALBUMIN UR-MCNC: 45.6 MG/DL
MICROALBUMIN/CREAT UR: 426.2 MG/G

## 2021-02-04 PROCEDURE — 82043 UR ALBUMIN QUANTITATIVE: CPT | Performed by: CLINICAL MEDICAL LABORATORY

## 2021-02-04 PROCEDURE — 82570 ASSAY OF URINE CREATININE: CPT | Performed by: CLINICAL MEDICAL LABORATORY

## 2021-02-04 PROCEDURE — PSEU8567 MICROALBUMIN URINE RANDOM: Performed by: CLINICAL MEDICAL LABORATORY

## 2021-03-12 ENCOUNTER — LAB REQUISITION (OUTPATIENT)
Dept: LAB | Age: 86
End: 2021-03-12

## 2021-03-12 DIAGNOSIS — R74.8 ABNORMAL LEVELS OF OTHER SERUM ENZYMES: ICD-10-CM

## 2021-03-12 PROCEDURE — 84075 ASSAY ALKALINE PHOSPHATASE: CPT | Performed by: CLINICAL MEDICAL LABORATORY

## 2021-03-12 PROCEDURE — PSEU9099 ALKALINE PHOSPHATASE, ISOENZYMES: Performed by: CLINICAL MEDICAL LABORATORY

## 2021-03-12 PROCEDURE — 84080 ASSAY ALKALINE PHOSPHATASES: CPT | Performed by: CLINICAL MEDICAL LABORATORY

## 2021-03-17 LAB
ALP BONE CFR SERPL: 26.1 % (ref 10.7–68.3)
ALP INTEST CFR SERPL: 12.2 % (ref 0–24.2)
ALP LIVER CFR SERPL: 61.6 % (ref 26–86.2)
ALP SERPL-CCNC: 229 U/L (ref 38–113)
BONE FRACTION: 59.8 U/L (ref 12.9–52.6)
INTESTINE FRACTION: 27.9 U/L (ref 0–16.3)
LIVER FRACTION: 141.1 U/L (ref 16–69.3)

## 2021-04-29 ENCOUNTER — LAB REQUISITION (OUTPATIENT)
Dept: LAB | Age: 86
End: 2021-04-29

## 2021-04-29 DIAGNOSIS — N18.30 CHRONIC KIDNEY DISEASE, STAGE 3 UNSPECIFIED (CMD): ICD-10-CM

## 2021-04-29 DIAGNOSIS — E11.22 TYPE 2 DIABETES MELLITUS WITH DIABETIC CHRONIC KIDNEY DISEASE (CMD): ICD-10-CM

## 2021-04-29 LAB
CREAT UR-MCNC: 69.7 MG/DL
MICROALBUMIN UR-MCNC: 31.2 MG/DL
MICROALBUMIN/CREAT UR: 447.6 MG/G

## 2021-04-29 PROCEDURE — 82043 UR ALBUMIN QUANTITATIVE: CPT | Performed by: CLINICAL MEDICAL LABORATORY

## 2021-04-29 PROCEDURE — 82570 ASSAY OF URINE CREATININE: CPT | Performed by: CLINICAL MEDICAL LABORATORY

## 2021-04-29 PROCEDURE — PSEU8567 MICROALBUMIN URINE RANDOM: Performed by: CLINICAL MEDICAL LABORATORY

## 2021-07-03 ENCOUNTER — LAB REQUISITION (OUTPATIENT)
Dept: LAB | Age: 86
End: 2021-07-03

## 2021-07-03 DIAGNOSIS — M10.9 GOUT, UNSPECIFIED: ICD-10-CM

## 2021-07-03 PROCEDURE — 87070 CULTURE OTHR SPECIMN AEROBIC: CPT | Performed by: CLINICAL MEDICAL LABORATORY

## 2021-07-03 PROCEDURE — 87075 CULTR BACTERIA EXCEPT BLOOD: CPT | Performed by: CLINICAL MEDICAL LABORATORY

## 2021-07-03 PROCEDURE — 87040 BLOOD CULTURE FOR BACTERIA: CPT | Performed by: CLINICAL MEDICAL LABORATORY

## 2021-07-03 PROCEDURE — PSEU8964 BLOOD CULTURE: Performed by: CLINICAL MEDICAL LABORATORY

## 2021-07-03 PROCEDURE — PSEU10256 ANAEROBE/AEROBE, BACTERIAL CULTURE WITH GRAM STAIN: Performed by: CLINICAL MEDICAL LABORATORY

## 2021-07-07 LAB — BACTERIA SPEC ANAEROBE+AEROBE CULT: NORMAL

## 2021-07-08 LAB
BACTERIA BLD CULT: NORMAL
BACTERIA BLD CULT: NORMAL

## 2021-08-03 ENCOUNTER — LAB REQUISITION (OUTPATIENT)
Dept: LAB | Age: 86
End: 2021-08-03

## 2021-08-03 DIAGNOSIS — R80.8 OTHER PROTEINURIA: ICD-10-CM

## 2021-08-03 DIAGNOSIS — E87.5 HYPERKALEMIA: ICD-10-CM

## 2021-08-03 DIAGNOSIS — R60.0 LOCALIZED EDEMA: ICD-10-CM

## 2021-08-03 DIAGNOSIS — E11.22 TYPE 2 DIABETES MELLITUS WITH DIABETIC CHRONIC KIDNEY DISEASE (CMD): ICD-10-CM

## 2021-08-03 DIAGNOSIS — I10 ESSENTIAL (PRIMARY) HYPERTENSION: ICD-10-CM

## 2021-08-03 DIAGNOSIS — D63.8 ANEMIA IN OTHER CHRONIC DISEASES CLASSIFIED ELSEWHERE: ICD-10-CM

## 2021-08-03 DIAGNOSIS — N18.30 CHRONIC KIDNEY DISEASE, STAGE 3 UNSPECIFIED (CMD): ICD-10-CM

## 2021-08-03 DIAGNOSIS — N18.4 CHRONIC KIDNEY DISEASE, STAGE 4 (SEVERE) (CMD): ICD-10-CM

## 2021-08-03 LAB
CREAT UR-MCNC: 117 MG/DL
MICROALBUMIN UR-MCNC: 32.8 MG/DL
MICROALBUMIN/CREAT UR: 280.3 MG/G

## 2021-08-03 PROCEDURE — PSEU8567 MICROALBUMIN URINE RANDOM: Performed by: CLINICAL MEDICAL LABORATORY

## 2021-08-03 PROCEDURE — 82043 UR ALBUMIN QUANTITATIVE: CPT | Performed by: CLINICAL MEDICAL LABORATORY

## 2021-08-03 PROCEDURE — 82570 ASSAY OF URINE CREATININE: CPT | Performed by: CLINICAL MEDICAL LABORATORY

## 2021-09-11 ENCOUNTER — LAB REQUISITION (OUTPATIENT)
Dept: LAB | Age: 86
End: 2021-09-11

## 2021-09-11 DIAGNOSIS — E11.22 TYPE 2 DIABETES MELLITUS WITH DIABETIC CHRONIC KIDNEY DISEASE (CMD): ICD-10-CM

## 2021-09-11 DIAGNOSIS — Z79.4 LONG TERM (CURRENT) USE OF INSULIN (CMD): ICD-10-CM

## 2021-09-11 DIAGNOSIS — N18.30 CHRONIC KIDNEY DISEASE, STAGE 3 UNSPECIFIED (CMD): ICD-10-CM

## 2021-09-11 LAB — HBA1C MFR BLD: 6.9 % (ref 4.5–5.6)

## 2021-09-11 PROCEDURE — 83036 HEMOGLOBIN GLYCOSYLATED A1C: CPT | Performed by: CLINICAL MEDICAL LABORATORY

## 2021-09-11 PROCEDURE — PSEU8266 GLYCOHEMOGLOBIN: Performed by: CLINICAL MEDICAL LABORATORY

## 2021-11-29 ENCOUNTER — LAB REQUISITION (OUTPATIENT)
Dept: LAB | Age: 86
End: 2021-11-29

## 2021-11-29 DIAGNOSIS — R80.8 OTHER PROTEINURIA: ICD-10-CM

## 2021-11-29 DIAGNOSIS — R60.0 LOCALIZED EDEMA: ICD-10-CM

## 2021-11-29 DIAGNOSIS — E11.22 TYPE 2 DIABETES MELLITUS WITH DIABETIC CHRONIC KIDNEY DISEASE (CMD): ICD-10-CM

## 2021-11-29 DIAGNOSIS — I10 ESSENTIAL (PRIMARY) HYPERTENSION: ICD-10-CM

## 2021-11-29 DIAGNOSIS — N18.30 CHRONIC KIDNEY DISEASE, STAGE 3 UNSPECIFIED (CMD): ICD-10-CM

## 2021-11-29 DIAGNOSIS — D63.8 ANEMIA IN OTHER CHRONIC DISEASES CLASSIFIED ELSEWHERE: ICD-10-CM

## 2021-11-29 LAB
CREAT UR-MCNC: 80.1 MG/DL
MICROALBUMIN UR-MCNC: 28.9 MG/DL
MICROALBUMIN/CREAT UR: 360.8 MG/G

## 2021-11-29 PROCEDURE — 82043 UR ALBUMIN QUANTITATIVE: CPT | Performed by: CLINICAL MEDICAL LABORATORY

## 2021-11-29 PROCEDURE — PSEU8567 MICROALBUMIN URINE RANDOM: Performed by: CLINICAL MEDICAL LABORATORY

## 2021-11-29 PROCEDURE — 82570 ASSAY OF URINE CREATININE: CPT | Performed by: CLINICAL MEDICAL LABORATORY

## 2021-12-24 ENCOUNTER — LAB REQUISITION (OUTPATIENT)
Dept: LAB | Age: 86
End: 2021-12-24

## 2021-12-24 DIAGNOSIS — I10 ESSENTIAL (PRIMARY) HYPERTENSION: ICD-10-CM

## 2021-12-24 DIAGNOSIS — E87.5 HYPERKALEMIA: ICD-10-CM

## 2021-12-24 DIAGNOSIS — N18.30 CHRONIC KIDNEY DISEASE, STAGE 3 UNSPECIFIED (CMD): ICD-10-CM

## 2021-12-24 DIAGNOSIS — R60.0 LOCALIZED EDEMA: ICD-10-CM

## 2021-12-24 DIAGNOSIS — E11.22 TYPE 2 DIABETES MELLITUS WITH DIABETIC CHRONIC KIDNEY DISEASE (CMD): ICD-10-CM

## 2021-12-24 LAB
CREAT UR-MCNC: 134 MG/DL
MICROALBUMIN UR-MCNC: 76.3 MG/DL
MICROALBUMIN/CREAT UR: 569.4 MG/G

## 2021-12-24 PROCEDURE — PSEU8567 MICROALBUMIN URINE RANDOM: Performed by: CLINICAL MEDICAL LABORATORY

## 2021-12-24 PROCEDURE — 82043 UR ALBUMIN QUANTITATIVE: CPT | Performed by: CLINICAL MEDICAL LABORATORY

## 2021-12-24 PROCEDURE — 82570 ASSAY OF URINE CREATININE: CPT | Performed by: CLINICAL MEDICAL LABORATORY

## 2022-03-18 ENCOUNTER — LAB REQUISITION (OUTPATIENT)
Dept: LAB | Age: 87
End: 2022-03-18

## 2022-03-18 DIAGNOSIS — N18.30 CHRONIC KIDNEY DISEASE, STAGE 3 UNSPECIFIED (CMD): ICD-10-CM

## 2022-03-18 DIAGNOSIS — E11.22 TYPE 2 DIABETES MELLITUS WITH DIABETIC CHRONIC KIDNEY DISEASE (CMD): ICD-10-CM

## 2022-03-18 DIAGNOSIS — Z79.4 LONG TERM (CURRENT) USE OF INSULIN (CMD): ICD-10-CM

## 2022-03-18 LAB — HBA1C MFR BLD: 7.2 % (ref 4.5–5.6)

## 2022-03-18 PROCEDURE — PSEU8266 GLYCOHEMOGLOBIN: Performed by: CLINICAL MEDICAL LABORATORY

## 2022-03-18 PROCEDURE — 83036 HEMOGLOBIN GLYCOSYLATED A1C: CPT | Performed by: CLINICAL MEDICAL LABORATORY

## 2022-04-29 ENCOUNTER — LAB REQUISITION (OUTPATIENT)
Dept: LAB | Age: 87
End: 2022-04-29

## 2022-04-29 DIAGNOSIS — R80.8 OTHER PROTEINURIA: ICD-10-CM

## 2022-04-29 DIAGNOSIS — R60.0 LOCALIZED EDEMA: ICD-10-CM

## 2022-04-29 DIAGNOSIS — E87.5 HYPERKALEMIA: ICD-10-CM

## 2022-04-29 DIAGNOSIS — E11.22 TYPE 2 DIABETES MELLITUS WITH DIABETIC CHRONIC KIDNEY DISEASE (CMD): ICD-10-CM

## 2022-04-29 DIAGNOSIS — D63.8 ANEMIA IN OTHER CHRONIC DISEASES CLASSIFIED ELSEWHERE: ICD-10-CM

## 2022-04-29 DIAGNOSIS — N18.4 CHRONIC KIDNEY DISEASE, STAGE 4 (SEVERE) (CMD): ICD-10-CM

## 2022-04-29 DIAGNOSIS — I10 ESSENTIAL (PRIMARY) HYPERTENSION: ICD-10-CM

## 2022-04-29 LAB
CREAT UR-MCNC: 62 MG/DL
MICROALBUMIN UR-MCNC: 46.9 MG/DL
MICROALBUMIN/CREAT UR: 756.5 MG/G

## 2022-04-29 PROCEDURE — 82570 ASSAY OF URINE CREATININE: CPT | Performed by: CLINICAL MEDICAL LABORATORY

## 2022-04-29 PROCEDURE — PSEU8567 MICROALBUMIN URINE RANDOM: Performed by: CLINICAL MEDICAL LABORATORY

## 2022-04-29 PROCEDURE — 82043 UR ALBUMIN QUANTITATIVE: CPT | Performed by: CLINICAL MEDICAL LABORATORY

## 2022-05-04 ENCOUNTER — LAB REQUISITION (OUTPATIENT)
Dept: LAB | Age: 87
End: 2022-05-04

## 2022-05-04 DIAGNOSIS — R79.89 OTHER SPECIFIED ABNORMAL FINDINGS OF BLOOD CHEMISTRY: ICD-10-CM

## 2022-05-04 PROCEDURE — 86381 MITOCHONDRIAL ANTIBODY EACH: CPT | Performed by: CLINICAL MEDICAL LABORATORY

## 2022-05-04 PROCEDURE — PSEU8534 IGG QUANTITATIVE: Performed by: CLINICAL MEDICAL LABORATORY

## 2022-05-04 PROCEDURE — 87340 HEPATITIS B SURFACE AG IA: CPT | Performed by: CLINICAL MEDICAL LABORATORY

## 2022-05-04 PROCEDURE — PSEU8469 ANA SCREEN WITH REFLEX IFA: Performed by: CLINICAL MEDICAL LABORATORY

## 2022-05-04 PROCEDURE — PSEU9099 ALKALINE PHOSPHATASE, ISOENZYMES: Performed by: CLINICAL MEDICAL LABORATORY

## 2022-05-04 PROCEDURE — 84080 ASSAY ALKALINE PHOSPHATASES: CPT | Performed by: CLINICAL MEDICAL LABORATORY

## 2022-05-04 PROCEDURE — PSEU8528 HEPATITIS C ANTIBODY WITH REFLEX: Performed by: CLINICAL MEDICAL LABORATORY

## 2022-05-04 PROCEDURE — 86803 HEPATITIS C AB TEST: CPT | Performed by: CLINICAL MEDICAL LABORATORY

## 2022-05-04 PROCEDURE — 84075 ASSAY ALKALINE PHOSPHATASE: CPT | Performed by: CLINICAL MEDICAL LABORATORY

## 2022-05-04 PROCEDURE — PSEU8482 HEPATITIS B SURFACE ANTIGEN: Performed by: CLINICAL MEDICAL LABORATORY

## 2022-05-04 PROCEDURE — PSEU8502 ANTI SMOOTH MUSCLE ANTIBODY, F ACTIN: Performed by: CLINICAL MEDICAL LABORATORY

## 2022-05-04 PROCEDURE — 86015 ACTIN ANTIBODY EACH: CPT | Performed by: CLINICAL MEDICAL LABORATORY

## 2022-05-04 PROCEDURE — 86038 ANTINUCLEAR ANTIBODIES: CPT | Performed by: CLINICAL MEDICAL LABORATORY

## 2022-05-04 PROCEDURE — 86708 HEPATITIS A ANTIBODY: CPT | Performed by: CLINICAL MEDICAL LABORATORY

## 2022-05-04 PROCEDURE — PSEU9046 MITOCHONDRIAL ANTIBODY: Performed by: CLINICAL MEDICAL LABORATORY

## 2022-05-04 PROCEDURE — PSEU8566 HEPATITIS B SURFACE ANTIBODY: Performed by: CLINICAL MEDICAL LABORATORY

## 2022-05-04 PROCEDURE — 82784 ASSAY IGA/IGD/IGG/IGM EACH: CPT | Performed by: CLINICAL MEDICAL LABORATORY

## 2022-05-04 PROCEDURE — PSEU8199 HEPATITIS A IGG AND IGM SCREEN: Performed by: CLINICAL MEDICAL LABORATORY

## 2022-05-04 PROCEDURE — 86706 HEP B SURFACE ANTIBODY: CPT | Performed by: CLINICAL MEDICAL LABORATORY

## 2022-05-05 LAB
ANA SER QL IA: NEGATIVE
HAV IGG+IGM SER QL: NEGATIVE
HBV SURFACE AB SER QL: NEGATIVE
HBV SURFACE AG SER QL: NEGATIVE
HCV AB SER QL: NEGATIVE
IGG SERPL-MCNC: 1230 MG/DL (ref 700–1600)
MITOCHONDRIA M2 IGG SER-ACNC: 6 UNITS
SMA AB SER QL IA: NEGATIVE

## 2022-05-09 LAB
ALP BONE CFR SERPL: 23.6 % (ref 10.7–68.3)
ALP INTEST CFR SERPL: 9.7 % (ref 0–24.2)
ALP LIVER CFR SERPL: 66.6 % (ref 26–86.2)
ALP SERPL-CCNC: 239 U/L (ref 38–113)
BONE FRACTION: 56.4 U/L (ref 12.9–52.6)
INTESTINE FRACTION: 23.2 U/L (ref 0–16.3)
LIVER FRACTION: 159.2 U/L (ref 16–69.3)

## 2022-08-30 ENCOUNTER — LAB REQUISITION (OUTPATIENT)
Dept: LAB | Age: 87
End: 2022-08-30

## 2022-08-30 DIAGNOSIS — E11.22 TYPE 2 DIABETES MELLITUS WITH DIABETIC CHRONIC KIDNEY DISEASE (CMD): ICD-10-CM

## 2022-08-30 DIAGNOSIS — R60.0 LOCALIZED EDEMA: ICD-10-CM

## 2022-08-30 DIAGNOSIS — R80.8 OTHER PROTEINURIA: ICD-10-CM

## 2022-08-30 DIAGNOSIS — I10 ESSENTIAL (PRIMARY) HYPERTENSION: ICD-10-CM

## 2022-08-30 DIAGNOSIS — D63.8 ANEMIA IN OTHER CHRONIC DISEASES CLASSIFIED ELSEWHERE: ICD-10-CM

## 2022-08-30 DIAGNOSIS — N18.4 CHRONIC KIDNEY DISEASE, STAGE 4 (SEVERE) (CMD): ICD-10-CM

## 2022-08-30 DIAGNOSIS — E87.5 HYPERKALEMIA: ICD-10-CM

## 2022-08-30 LAB
CREAT UR-MCNC: 212 MG/DL
MICROALBUMIN UR-MCNC: 168 MG/DL
MICROALBUMIN/CREAT UR: 792.5 MG/G

## 2022-08-30 PROCEDURE — 82570 ASSAY OF URINE CREATININE: CPT | Performed by: CLINICAL MEDICAL LABORATORY

## 2022-08-30 PROCEDURE — 82043 UR ALBUMIN QUANTITATIVE: CPT | Performed by: CLINICAL MEDICAL LABORATORY

## 2022-08-30 PROCEDURE — PSEU8567 MICROALBUMIN URINE RANDOM: Performed by: CLINICAL MEDICAL LABORATORY

## 2022-09-19 ENCOUNTER — LAB REQUISITION (OUTPATIENT)
Dept: LAB | Age: 87
End: 2022-09-19

## 2022-09-19 DIAGNOSIS — Z79.4 LONG TERM (CURRENT) USE OF INSULIN (CMD): ICD-10-CM

## 2022-09-19 DIAGNOSIS — N18.30 CHRONIC KIDNEY DISEASE, STAGE 3 UNSPECIFIED (CMD): ICD-10-CM

## 2022-09-19 DIAGNOSIS — E11.22 TYPE 2 DIABETES MELLITUS WITH DIABETIC CHRONIC KIDNEY DISEASE (CMD): ICD-10-CM

## 2022-09-19 PROCEDURE — PSEU8266 GLYCOHEMOGLOBIN: Performed by: CLINICAL MEDICAL LABORATORY

## 2022-09-19 PROCEDURE — 83036 HEMOGLOBIN GLYCOSYLATED A1C: CPT | Performed by: CLINICAL MEDICAL LABORATORY

## 2022-09-19 PROCEDURE — PSEU8306 VITAMIN B12: Performed by: CLINICAL MEDICAL LABORATORY

## 2022-09-19 PROCEDURE — 82607 VITAMIN B-12: CPT | Performed by: CLINICAL MEDICAL LABORATORY

## 2022-09-20 LAB
HBA1C MFR BLD: 7.2 % (ref 4.5–5.6)
VIT B12 SERPL-MCNC: 1755 PG/ML (ref 211–911)

## 2022-12-14 ENCOUNTER — LAB REQUISITION (OUTPATIENT)
Dept: LAB | Age: 87
End: 2022-12-14

## 2022-12-14 DIAGNOSIS — D64.9 ANEMIA, UNSPECIFIED: ICD-10-CM

## 2022-12-14 PROCEDURE — PSEU8306 VITAMIN B12: Performed by: CLINICAL MEDICAL LABORATORY

## 2022-12-14 PROCEDURE — 82746 ASSAY OF FOLIC ACID SERUM: CPT | Performed by: CLINICAL MEDICAL LABORATORY

## 2022-12-14 PROCEDURE — PSEU8260 FOLATE: Performed by: CLINICAL MEDICAL LABORATORY

## 2022-12-14 PROCEDURE — 82607 VITAMIN B-12: CPT | Performed by: CLINICAL MEDICAL LABORATORY

## 2022-12-15 LAB
FOLATE SERPL-MCNC: >24 NG/ML
VIT B12 SERPL-MCNC: >2000 PG/ML (ref 211–911)

## 2023-02-03 ENCOUNTER — LAB REQUISITION (OUTPATIENT)
Dept: LAB | Age: 88
End: 2023-02-03

## 2023-02-03 DIAGNOSIS — J84.9 INTERSTITIAL PULMONARY DISEASE, UNSPECIFIED (CMD): ICD-10-CM

## 2023-02-03 LAB — RHEUMATOID FACT SER NEPH-ACNC: <10 UNITS/ML

## 2023-02-03 PROCEDURE — 86431 RHEUMATOID FACTOR QUANT: CPT | Performed by: CLINICAL MEDICAL LABORATORY

## 2023-02-03 PROCEDURE — PSEU8550 RHEUMATOID FACTOR: Performed by: CLINICAL MEDICAL LABORATORY

## 2023-02-03 PROCEDURE — 86038 ANTINUCLEAR ANTIBODIES: CPT | Performed by: CLINICAL MEDICAL LABORATORY

## 2023-02-03 PROCEDURE — PSEU8519 CYCLIC CITRULLINATED PEPTIDE ANTIBODY IGG & IGA: Performed by: CLINICAL MEDICAL LABORATORY

## 2023-02-03 PROCEDURE — PSEU8499 ANA SCREEN WITH ANTIBODY AND IFA REFLEX: Performed by: CLINICAL MEDICAL LABORATORY

## 2023-02-03 PROCEDURE — 86200 CCP ANTIBODY: CPT | Performed by: CLINICAL MEDICAL LABORATORY

## 2023-02-05 LAB — CCP AB SER IA-ACNC: 4 UNITS

## 2023-02-06 LAB — ANA SER QL IA: NEGATIVE

## 2023-02-26 PROCEDURE — PSEU10635 2019 NOVEL CORONAVIRUS (SARS-COV-2): Performed by: CLINICAL MEDICAL LABORATORY

## 2023-02-26 PROCEDURE — U0005 INFEC AGEN DETEC AMPLI PROBE: HCPCS | Performed by: CLINICAL MEDICAL LABORATORY

## 2023-02-26 PROCEDURE — U0003 INFECTIOUS AGENT DETECTION BY NUCLEIC ACID (DNA OR RNA); SEVERE ACUTE RESPIRATORY SYNDROME CORONAVIRUS 2 (SARS-COV-2) (CORONAVIRUS DISEASE [COVID-19]), AMPLIFIED PROBE TECHNIQUE, MAKING USE OF HIGH THROUGHPUT TECHNOLOGIES AS DESCRIBED BY CMS-2020-01-R: HCPCS | Performed by: CLINICAL MEDICAL LABORATORY

## 2023-02-27 ENCOUNTER — LAB REQUISITION (OUTPATIENT)
Dept: LAB | Age: 88
End: 2023-02-27

## 2023-02-27 DIAGNOSIS — J84.9 INTERSTITIAL PULMONARY DISEASE, UNSPECIFIED (CMD): ICD-10-CM

## 2023-02-27 LAB
SARS-COV-2 RNA RESP QL NAA+PROBE: NOT DETECTED
SERVICE CMNT-IMP: NORMAL
SERVICE CMNT-IMP: NORMAL

## 2023-03-01 ENCOUNTER — LAB REQUISITION (OUTPATIENT)
Dept: LAB | Age: 88
End: 2023-03-01

## 2023-03-01 DIAGNOSIS — R91.8 OTHER NONSPECIFIC ABNORMAL FINDING OF LUNG FIELD: ICD-10-CM

## 2023-03-01 PROCEDURE — 88312 SPECIAL STAINS GROUP 1: CPT | Performed by: CLINICAL MEDICAL LABORATORY

## 2023-03-01 PROCEDURE — PSEU8969 MYCOBACTERIAL CULTURE AND ACID FAST SMEAR: Performed by: CLINICAL MEDICAL LABORATORY

## 2023-03-01 PROCEDURE — 87205 SMEAR GRAM STAIN: CPT | Performed by: CLINICAL MEDICAL LABORATORY

## 2023-03-01 PROCEDURE — PSEU8981 FUNGAL CULTURE AND SMEAR: Performed by: CLINICAL MEDICAL LABORATORY

## 2023-03-01 PROCEDURE — 87206 SMEAR FLUORESCENT/ACID STAI: CPT | Performed by: CLINICAL MEDICAL LABORATORY

## 2023-03-01 PROCEDURE — 87015 SPECIMEN INFECT AGNT CONCNTJ: CPT | Performed by: CLINICAL MEDICAL LABORATORY

## 2023-03-01 PROCEDURE — 87116 MYCOBACTERIA CULTURE: CPT | Performed by: CLINICAL MEDICAL LABORATORY

## 2023-03-01 PROCEDURE — PSEU9001 BRONCHIAL, BACTERIAL CULTURE WITH GRAM STAIN: Performed by: CLINICAL MEDICAL LABORATORY

## 2023-03-01 PROCEDURE — 88305 TISSUE EXAM BY PATHOLOGIST: CPT | Performed by: CLINICAL MEDICAL LABORATORY

## 2023-03-01 PROCEDURE — 87102 FUNGUS ISOLATION CULTURE: CPT | Performed by: CLINICAL MEDICAL LABORATORY

## 2023-03-01 PROCEDURE — 87070 CULTURE OTHR SPECIMN AEROBIC: CPT | Performed by: CLINICAL MEDICAL LABORATORY

## 2023-03-03 LAB
ASR DISCLAIMER: NORMAL
BACTERIA BRONCH AEROBE CULT: NORMAL
CASE RPRT: NORMAL
CLINICAL INFO: NORMAL
GRAM STN SPEC: NORMAL
PATH REPORT.FINAL DX SPEC: NORMAL
PATH REPORT.GROSS SPEC: NORMAL
PATH REPORT.MICROSCOPIC SPEC OTHER STN: NORMAL

## 2023-03-22 ENCOUNTER — LAB REQUISITION (OUTPATIENT)
Dept: LAB | Age: 88
End: 2023-03-22

## 2023-03-22 DIAGNOSIS — N18.30 CHRONIC KIDNEY DISEASE, STAGE 3 UNSPECIFIED (CMD): ICD-10-CM

## 2023-03-22 DIAGNOSIS — E11.22 TYPE 2 DIABETES MELLITUS WITH DIABETIC CHRONIC KIDNEY DISEASE (CMD): ICD-10-CM

## 2023-03-22 DIAGNOSIS — Z79.4 LONG TERM (CURRENT) USE OF INSULIN (CMD): ICD-10-CM

## 2023-03-22 LAB
CREAT UR-MCNC: 81.6 MG/DL
HBA1C MFR BLD: 7.3 % (ref 4.5–5.6)
MICROALBUMIN UR-MCNC: 113 MG/DL
MICROALBUMIN/CREAT UR: 1384.8 MG/G

## 2023-03-22 PROCEDURE — 82570 ASSAY OF URINE CREATININE: CPT | Performed by: CLINICAL MEDICAL LABORATORY

## 2023-03-22 PROCEDURE — PSEU8567 MICROALBUMIN URINE RANDOM: Performed by: CLINICAL MEDICAL LABORATORY

## 2023-03-22 PROCEDURE — 83036 HEMOGLOBIN GLYCOSYLATED A1C: CPT | Performed by: CLINICAL MEDICAL LABORATORY

## 2023-03-22 PROCEDURE — PSEU8266 GLYCOHEMOGLOBIN: Performed by: CLINICAL MEDICAL LABORATORY

## 2023-03-22 PROCEDURE — 82043 UR ALBUMIN QUANTITATIVE: CPT | Performed by: CLINICAL MEDICAL LABORATORY

## 2023-03-29 LAB
FUNGUS SPEC CULT: NORMAL
FUNGUS SPEC FUNGUS STN: NORMAL

## 2023-04-12 LAB
ACID FAST STN SPEC: NORMAL
MYCOBACTERIUM SPEC CULT: NORMAL

## 2023-07-05 ENCOUNTER — LAB REQUISITION (OUTPATIENT)
Dept: LAB | Age: 88
End: 2023-07-05

## 2023-07-05 DIAGNOSIS — N28.9 DISORDER OF KIDNEY AND URETER, UNSPECIFIED: ICD-10-CM

## 2023-07-05 DIAGNOSIS — R79.89 OTHER SPECIFIED ABNORMAL FINDINGS OF BLOOD CHEMISTRY: ICD-10-CM

## 2023-07-05 DIAGNOSIS — E16.2 HYPOGLYCEMIA, UNSPECIFIED: ICD-10-CM

## 2023-07-05 DIAGNOSIS — R47.81 SLURRED SPEECH: ICD-10-CM

## 2023-07-05 DIAGNOSIS — R79.1 ABNORMAL COAGULATION PROFILE: ICD-10-CM

## 2023-07-05 LAB — HBA1C MFR BLD: 7.2 % (ref 4.5–5.6)

## 2023-07-05 PROCEDURE — PSEU8266 GLYCOHEMOGLOBIN: Performed by: CLINICAL MEDICAL LABORATORY

## 2023-07-05 PROCEDURE — 83036 HEMOGLOBIN GLYCOSYLATED A1C: CPT | Performed by: CLINICAL MEDICAL LABORATORY

## 2024-01-31 ENCOUNTER — LAB REQUISITION (OUTPATIENT)
Dept: LAB | Age: 89
End: 2024-01-31

## 2024-01-31 DIAGNOSIS — Z78.9 OTHER SPECIFIED HEALTH STATUS: ICD-10-CM

## 2024-01-31 DIAGNOSIS — I50.9 HEART FAILURE, UNSPECIFIED  (CMD): ICD-10-CM

## 2024-01-31 DIAGNOSIS — E88.09 OTHER DISORDERS OF PLASMA-PROTEIN METABOLISM, NOT ELSEWHERE CLASSIFIED: ICD-10-CM

## 2024-01-31 DIAGNOSIS — N18.9 CHRONIC KIDNEY DISEASE, UNSPECIFIED: ICD-10-CM

## 2024-01-31 DIAGNOSIS — R60.0 LOCALIZED EDEMA: ICD-10-CM

## 2024-01-31 PROCEDURE — 87070 CULTURE OTHR SPECIMN AEROBIC: CPT | Performed by: CLINICAL MEDICAL LABORATORY

## 2024-01-31 PROCEDURE — 88305 TISSUE EXAM BY PATHOLOGIST: CPT | Performed by: CLINICAL MEDICAL LABORATORY

## 2024-01-31 PROCEDURE — 87205 SMEAR GRAM STAIN: CPT | Performed by: CLINICAL MEDICAL LABORATORY

## 2024-01-31 PROCEDURE — PSEU10256 ANAEROBE/AEROBE, BACTERIAL CULTURE WITH GRAM STAIN: Performed by: CLINICAL MEDICAL LABORATORY

## 2024-01-31 PROCEDURE — 88112 CYTOPATH CELL ENHANCE TECH: CPT | Performed by: CLINICAL MEDICAL LABORATORY

## 2024-01-31 PROCEDURE — 87075 CULTR BACTERIA EXCEPT BLOOD: CPT | Performed by: CLINICAL MEDICAL LABORATORY

## 2024-02-01 PROCEDURE — PSEU8175 ALPHA FETOPROTEIN TUMOR MARKER: Performed by: CLINICAL MEDICAL LABORATORY

## 2024-02-01 PROCEDURE — 82105 ALPHA-FETOPROTEIN SERUM: CPT | Performed by: CLINICAL MEDICAL LABORATORY

## 2024-02-02 LAB
AFP-TM SERPL-MCNC: <3 NG/ML
ASR DISCLAIMER: NORMAL
BACTERIA SPEC ANAEROBE+AEROBE CULT: NORMAL
CASE RPRT: NORMAL
CLINICAL INFO: NORMAL
GRAM STN SPEC: NORMAL
PATH REPORT.FINAL DX SPEC: NORMAL
PATH REPORT.GROSS SPEC: NORMAL
PATH REPORT.MICROSCOPIC SPEC OTHER STN: NORMAL

## 2024-02-03 LAB — A1AT SERPL-MCNC: 142 MG/DL (ref 90–200)

## 2024-02-03 PROCEDURE — 82103 ALPHA-1-ANTITRYPSIN TOTAL: CPT | Performed by: CLINICAL MEDICAL LABORATORY

## 2024-02-03 PROCEDURE — 80074 ACUTE HEPATITIS PANEL: CPT | Performed by: CLINICAL MEDICAL LABORATORY

## 2024-02-03 PROCEDURE — PSEU8498 ALPHA 1 ANTITRYPSIN: Performed by: CLINICAL MEDICAL LABORATORY

## 2024-02-03 PROCEDURE — PSEU8200 HEPATITIS SEROLOGY PANEL ACUTE WITH REFLEX HCV PCR: Performed by: CLINICAL MEDICAL LABORATORY

## 2024-02-04 LAB
ANNOTATION COMMENT IMP: NORMAL
BACTERIA SPEC ANAEROBE+AEROBE CULT: NORMAL
GRAM STN SPEC: NORMAL
HAV IGM SER QL: NEGATIVE
HBV CORE IGM SER QL: NEGATIVE
HBV SURFACE AG SER QL: NEGATIVE
HCV AB SER QL: NEGATIVE
IMP & REVIEW OF LAB RESULTS: NORMAL

## 2024-02-05 PROCEDURE — 88341 IMHCHEM/IMCYTCHM EA ADD ANTB: CPT | Performed by: CLINICAL MEDICAL LABORATORY

## 2024-02-05 PROCEDURE — PSEU8171 VITAMIN B12 AND FOLATE: Performed by: CLINICAL MEDICAL LABORATORY

## 2024-02-05 PROCEDURE — 88172 CYTP DX EVAL FNA 1ST EA SITE: CPT | Performed by: CLINICAL MEDICAL LABORATORY

## 2024-02-05 PROCEDURE — 88173 CYTOPATH EVAL FNA REPORT: CPT | Performed by: CLINICAL MEDICAL LABORATORY

## 2024-02-05 PROCEDURE — 82607 VITAMIN B-12: CPT | Performed by: CLINICAL MEDICAL LABORATORY

## 2024-02-05 PROCEDURE — 88305 TISSUE EXAM BY PATHOLOGIST: CPT | Performed by: CLINICAL MEDICAL LABORATORY

## 2024-02-05 PROCEDURE — 88342 IMHCHEM/IMCYTCHM 1ST ANTB: CPT | Performed by: CLINICAL MEDICAL LABORATORY

## 2024-02-05 PROCEDURE — 82746 ASSAY OF FOLIC ACID SERUM: CPT | Performed by: CLINICAL MEDICAL LABORATORY

## 2024-02-05 PROCEDURE — 88313 SPECIAL STAINS GROUP 2: CPT | Performed by: CLINICAL MEDICAL LABORATORY

## 2024-02-06 LAB
FOLATE SERPL-MCNC: 22.9 NG/ML
VIT B12 SERPL-MCNC: >2000 PG/ML (ref 211–911)

## 2024-02-07 ENCOUNTER — LAB REQUISITION (OUTPATIENT)
Dept: LAB | Age: 89
End: 2024-02-07

## 2024-02-07 DIAGNOSIS — E11.69 TYPE 2 DIABETES MELLITUS WITH OTHER SPECIFIED COMPLICATION  (CMD): ICD-10-CM

## 2024-02-07 DIAGNOSIS — Z79.4 LONG TERM (CURRENT) USE OF INSULIN  (CMD): ICD-10-CM

## 2024-02-07 LAB — HBA1C MFR BLD: 5.9 % (ref 4.5–5.6)

## 2024-02-07 PROCEDURE — 83036 HEMOGLOBIN GLYCOSYLATED A1C: CPT | Performed by: CLINICAL MEDICAL LABORATORY

## 2024-02-07 PROCEDURE — PSEU8266 GLYCOHEMOGLOBIN: Performed by: CLINICAL MEDICAL LABORATORY

## 2024-02-08 LAB
ASR DISCLAIMER: NORMAL
CASE RPRT: NORMAL
CLINICAL INFO: NORMAL
PATH REPORT.FINAL DX SPEC: NORMAL
PATH REPORT.GROSS SPEC: NORMAL
PATH REPORT.INTRAOP OBS SPEC DOC: NORMAL
PATH REPORT.MICROSCOPIC SPEC OTHER STN: NORMAL

## 2024-04-12 DIAGNOSIS — N18.4 CHRONIC KIDNEY DISEASE, STAGE 4 (SEVERE)  (CMD): Primary | ICD-10-CM

## 2024-04-14 DIAGNOSIS — N18.4 CHRONIC KIDNEY DISEASE, STAGE 4 (SEVERE)  (CMD): ICD-10-CM

## 2024-04-30 ENCOUNTER — LAB REQUISITION (OUTPATIENT)
Dept: LAB | Age: 89
End: 2024-04-30

## 2024-04-30 DIAGNOSIS — Z79.4 LONG TERM (CURRENT) USE OF INSULIN  (CMD): ICD-10-CM

## 2024-04-30 DIAGNOSIS — E11.69 TYPE 2 DIABETES MELLITUS WITH OTHER SPECIFIED COMPLICATION  (CMD): ICD-10-CM

## 2024-04-30 PROCEDURE — PSEU8567 MICROALBUMIN URINE RANDOM: Performed by: CLINICAL MEDICAL LABORATORY

## 2024-04-30 PROCEDURE — 82043 UR ALBUMIN QUANTITATIVE: CPT | Performed by: CLINICAL MEDICAL LABORATORY

## 2024-04-30 PROCEDURE — 82570 ASSAY OF URINE CREATININE: CPT | Performed by: CLINICAL MEDICAL LABORATORY

## 2024-05-01 LAB
CREAT UR-MCNC: 57.8 MG/DL
MICROALBUMIN UR-MCNC: 27.9 MG/DL
MICROALBUMIN/CREAT UR: 482.7 MG/G